# Patient Record
Sex: FEMALE | Race: WHITE | NOT HISPANIC OR LATINO | Employment: UNEMPLOYED | URBAN - METROPOLITAN AREA
[De-identification: names, ages, dates, MRNs, and addresses within clinical notes are randomized per-mention and may not be internally consistent; named-entity substitution may affect disease eponyms.]

---

## 2024-05-12 ENCOUNTER — HOSPITAL ENCOUNTER (EMERGENCY)
Facility: HOSPITAL | Age: 19
Discharge: HOME | End: 2024-05-12
Attending: EMERGENCY MEDICINE
Payer: COMMERCIAL

## 2024-05-12 VITALS
TEMPERATURE: 98 F | OXYGEN SATURATION: 100 % | WEIGHT: 136 LBS | HEART RATE: 89 BPM | SYSTOLIC BLOOD PRESSURE: 118 MMHG | DIASTOLIC BLOOD PRESSURE: 67 MMHG | RESPIRATION RATE: 18 BRPM

## 2024-05-12 DIAGNOSIS — R07.89 CHEST PAIN, NON-CARDIAC: Primary | ICD-10-CM

## 2024-05-12 DIAGNOSIS — R07.9 CHEST PAIN: ICD-10-CM

## 2024-05-12 LAB
B-HCG UR QL: NEGATIVE
CTP QC/QA: YES

## 2024-05-12 PROCEDURE — 93005 ELECTROCARDIOGRAM TRACING: CPT

## 2024-05-12 PROCEDURE — 99284 EMERGENCY DEPT VISIT MOD MDM: CPT | Mod: 25

## 2024-05-12 PROCEDURE — 81025 URINE PREGNANCY TEST: CPT | Performed by: EMERGENCY MEDICINE

## 2024-05-12 PROCEDURE — 93010 ELECTROCARDIOGRAM REPORT: CPT | Mod: ,,, | Performed by: INTERNAL MEDICINE

## 2024-05-12 RX ORDER — IBUPROFEN 600 MG/1
600 TABLET ORAL EVERY 6 HOURS PRN
Qty: 20 TABLET | Refills: 0 | Status: SHIPPED | OUTPATIENT
Start: 2024-05-12

## 2024-05-12 NOTE — ED PROVIDER NOTES
Encounter Date: 5/12/2024    SCRIBE #1 NOTE: I, Shasta Fishman, am scribing for, and in the presence of,  Nicanor Andrade MD. I have scribed the following portions of the note - Other sections scribed: HPI, ROS.       History     Chief Complaint   Patient presents with    Chest Pain     Pt c/o L chest/ L axilla pain, intermittently x 3 months. Pt was at rest at onset today. Pain did not radiate or reproduce. Pt denies other complaints at time of CP. Pain is still present but not as bad. Pt denies recent trauma or illness. VSSAnselmo Way is a 19 y.o. female, with no pertinent PMHx, who presents to the ED with intermittent pain under her left breast that radiates to her left armpit that initially began around 3 months ago. Patient reports that she has episodes, that often last less than a minute, in which her chest will feel heavy. Notes that it is not pleuritic and that she feels no pain with torsion of her upper body or upper extremities. No other exacerbating or alleviating factors. Denies cough, SOB, fever, chills, abdominal pain, nausea, vomiting, diarrhea, dysuria, headache, congestion, sore throat, arm pain, leg pain, eye pain, ear pain, rash or other associated symptoms. Denies a possibility of pregnancy.      The history is provided by the patient. No  was used.     Review of patient's allergies indicates:  No Known Allergies  History reviewed. No pertinent past medical history.  History reviewed. No pertinent surgical history.  No family history on file.  Social History     Tobacco Use    Smoking status: Never    Smokeless tobacco: Never   Substance Use Topics    Alcohol use: Never    Drug use: Never     Review of Systems   Constitutional:  Negative for chills, diaphoresis and fever.   HENT:  Negative for ear pain and sore throat.    Eyes:  Negative for pain.   Respiratory:  Negative for cough and shortness of breath.    Cardiovascular:  Positive for chest pain (under left breast  radiating to left axillary region).   Gastrointestinal:  Negative for abdominal pain, diarrhea, nausea and vomiting.   Genitourinary:  Negative for dysuria.   Musculoskeletal:  Negative for back pain.        (-) Arm or leg trouble.    Skin:  Negative for rash.   Neurological:  Negative for headaches.   Psychiatric/Behavioral:  Negative for confusion.        Physical Exam     Initial Vitals [05/12/24 0649]   BP Pulse Resp Temp SpO2   118/67 89 18 97.6 °F (36.4 °C) 100 %      MAP       --         Physical Exam  The patient was examined specifically for the following:   General:No significant distress, Good color, Warm and dry. Head and neck:Scalp atraumatic, Neck supple. Neurological:Appropriate conversation, Gross motor deficits. Eyes:Conjugate gaze, Clear corneas. ENT: No epistaxis. Cardiac: Regular rate and rhythm, Grossly normal heart tones. Pulmonary: Wheezing, Rales. Gastrointestinal: Abdominal tenderness, Abdominal distention. Musculoskeletal: Extremity deformity, Apparent pain with range of motion of the joints. Skin: Rash.   The findings on examination were normal.  The lungs are clear.  The heart tones are normal.  ED Course   Procedures  Labs Reviewed   POCT URINE PREGNANCY     EKG Readings: (Independently Interpreted)   This patient is in a normal sinus rhythm with a heart rate of 82.  There are no significant ST segment or T-wave changes.  There is no definite evidence of acute myocardial infarction or malignant arrhythmia.  This is doctor Andrade dictating and I independently interpreted this EKG.       Imaging Results              X-Ray Chest 1 View (Final result)  Result time 05/12/24 09:00:44      Final result by Baljit Schilling MD (05/12/24 09:00:44)                   Impression:      No acute cardiopulmonary finding identified on this single view.      Electronically signed by: Baljit Schilling MD  Date:    05/12/2024  Time:    09:00               Narrative:    EXAMINATION:  XR CHEST 1  "VIEW    CLINICAL HISTORY:  Provided history is "  Chest pain, unspecified".    TECHNIQUE:  One view of the chest.    COMPARISON:  None.    FINDINGS:  Cardiac silhouette is not enlarged.  No focal consolidation.  No sizable pleural effusion.  No pneumothorax.  Minimal scoliotic curvature of the thoracic spine.                                       Medications - No data to display  Medical Decision Making  Amount and/or Complexity of Data Reviewed  Labs: ordered.  Radiology: ordered.    Risk  Prescription drug management.    Given the above this patient presents emergency with sharp stabbing left lateral chest pain that comes and goes over the course of the last month.  The pain lasts a brief 2nd.  There is no shortness of breath leg swelling.  There is no hypoxia tachypnea tachycardia.  The patient looks well.  She denies rash.  I doubt herpes zoster.  I will treat the patient with ibuprofen and have her follow up with primary care.  Chest x-ray was interpreted by me.  There is no pneumothorax pneumonia pleural effusion.  I see no mass.  I carefully considered pulmonary embolus.  This patient is perc negative.  There is no tachycardia shortness of breath or leg swelling.  All of the disease entities mentioned above are considered in the differential diagnosis.  Radiology interpreted the chest x-ray as negative.        Scribe Attestation:   Scribe #1: I performed the above scribed service and the documentation accurately describes the services I performed. I attest to the accuracy of the note.                           Please note that the documentation on this chart was provided by the scribe above on the date of service noted above, and that the documentation in the chart accurately reflects the work and decisions made by me alone.  Signed, Dr. Andrade    Clinical Impression:  Final diagnoses:  [R07.9] Chest pain  [R07.89] Chest pain, non-cardiac (Primary)          ED Disposition Condition    Discharge Stable      "     ED Prescriptions       Medication Sig Dispense Start Date End Date Auth. Provider    ibuprofen (ADVIL,MOTRIN) 600 MG tablet Take 1 tablet (600 mg total) by mouth every 6 (six) hours as needed. 20 tablet 5/12/2024 -- Nicanor Andrade MD          Follow-up Information       Follow up With Specialties Details Why Contact Info    Morris Mcbride MD Internal Medicine, Wound Care In 1 week  [address] [facility] [address]  [phone]               Nicanor Andrade MD  05/12/24 0821       Nicanor Andrade MD  05/12/24 3153

## 2024-05-12 NOTE — DISCHARGE INSTRUCTIONS
Please return immediately if you get worse or if new problems develop.  Please follow-up with your primary care doctor this week.  Rest.  You may follow up with the primary care doctor above.  Ibuprofen for discomfort.

## 2024-05-13 LAB
OHS QRS DURATION: 78 MS
OHS QTC CALCULATION: 408 MS